# Patient Record
Sex: MALE | Race: WHITE | Employment: FULL TIME | URBAN - METROPOLITAN AREA
[De-identification: names, ages, dates, MRNs, and addresses within clinical notes are randomized per-mention and may not be internally consistent; named-entity substitution may affect disease eponyms.]

---

## 2019-11-04 ENCOUNTER — HOSPITAL ENCOUNTER (OUTPATIENT)
Dept: SPEECH THERAPY | Age: 44
Setting detail: THERAPIES SERIES
Discharge: HOME | End: 2019-11-04
Payer: COMMERCIAL

## 2019-11-04 ENCOUNTER — HOSPITAL ENCOUNTER (OUTPATIENT)
Dept: GENERAL RADIOLOGY | Age: 44
Discharge: HOME | End: 2019-11-04
Payer: COMMERCIAL

## 2019-11-04 DIAGNOSIS — J30.9 ALLERGIC RHINITIS, UNSPECIFIED SEASONALITY, UNSPECIFIED TRIGGER: ICD-10-CM

## 2019-11-04 DIAGNOSIS — R09.89 CHOKING SENSATION: ICD-10-CM

## 2019-11-04 DIAGNOSIS — E87.5 HYPERKALEMIA: ICD-10-CM

## 2019-11-04 DIAGNOSIS — R74.8 ELEVATED LIVER ENZYMES: ICD-10-CM

## 2019-11-04 DIAGNOSIS — R63.5 ABNORMAL WEIGHT GAIN: ICD-10-CM

## 2019-11-04 DIAGNOSIS — E78.5 HYPERLIPIDEMIA, UNSPECIFIED HYPERLIPIDEMIA TYPE: ICD-10-CM

## 2019-11-04 DIAGNOSIS — R09.89 ABNORMAL CHEST SOUNDS: ICD-10-CM

## 2019-11-04 PROCEDURE — 92611 MOTION FLUOROSCOPY/SWALLOW: CPT

## 2019-11-04 PROCEDURE — 74230 X-RAY XM SWLNG FUNCJ C+: CPT

## 2022-12-09 ENCOUNTER — TELEPHONE (OUTPATIENT)
Dept: BARIATRICS/WEIGHT MGMT | Age: 47
End: 2022-12-09

## 2023-01-19 ENCOUNTER — OFFICE VISIT (OUTPATIENT)
Dept: BARIATRICS/WEIGHT MGMT | Age: 48
End: 2023-01-19
Payer: COMMERCIAL

## 2023-01-19 VITALS
DIASTOLIC BLOOD PRESSURE: 80 MMHG | HEART RATE: 68 BPM | BODY MASS INDEX: 36.64 KG/M2 | SYSTOLIC BLOOD PRESSURE: 120 MMHG | WEIGHT: 285.5 LBS | OXYGEN SATURATION: 100 % | HEIGHT: 74 IN

## 2023-01-19 DIAGNOSIS — Z79.899 MEDICATION MANAGEMENT: ICD-10-CM

## 2023-01-19 DIAGNOSIS — K42.9 UMBILICAL HERNIA WITHOUT OBSTRUCTION OR GANGRENE: ICD-10-CM

## 2023-01-19 DIAGNOSIS — E66.01 MORBID OBESITY DUE TO EXCESS CALORIES (HCC): Primary | ICD-10-CM

## 2023-01-19 DIAGNOSIS — Z01.818 PRE-OPERATIVE CLEARANCE: ICD-10-CM

## 2023-01-19 PROCEDURE — 99204 OFFICE O/P NEW MOD 45 MIN: CPT | Performed by: SURGERY

## 2023-01-19 RX ORDER — CITALOPRAM 20 MG/1
TABLET ORAL
COMMUNITY
Start: 2022-11-23

## 2023-01-19 RX ORDER — TOPIRAMATE 25 MG/1
TABLET ORAL
COMMUNITY
Start: 2020-03-06

## 2023-01-19 RX ORDER — ROSUVASTATIN CALCIUM 20 MG/1
TABLET, COATED ORAL
COMMUNITY
Start: 2022-11-23

## 2023-01-19 RX ORDER — PANTOPRAZOLE SODIUM 40 MG/1
TABLET, DELAYED RELEASE ORAL
COMMUNITY
Start: 2022-11-23

## 2023-01-19 RX ORDER — ALBUTEROL SULFATE 90 UG/1
AEROSOL, METERED RESPIRATORY (INHALATION)
COMMUNITY

## 2023-01-19 RX ORDER — MONTELUKAST SODIUM 10 MG/1
TABLET ORAL
COMMUNITY

## 2023-01-19 RX ORDER — LOSARTAN POTASSIUM AND HYDROCHLOROTHIAZIDE 25; 100 MG/1; MG/1
TABLET ORAL
COMMUNITY
Start: 2022-11-23

## 2023-01-19 ASSESSMENT — ENCOUNTER SYMPTOMS
SINUS PRESSURE: 1
GASTROINTESTINAL NEGATIVE: 1
SINUS PAIN: 1
RESPIRATORY NEGATIVE: 1
ALLERGIC/IMMUNOLOGIC NEGATIVE: 1

## 2023-01-19 NOTE — PROGRESS NOTES
Initial Bariatric Surgery Consultation History and Physical    Chief Complaint: Obesity Body mass index is 37.16 kg/m². History of Present Illness: The patient is a 50 y.o. male being seen today for initial bariatric surgery consultation. The patient previously attended a bariatric surgery informational seminar or received electronic version of presentation. The patient's PCP is Dr. Brynn Gautam. The patient first recognized having issues with increased weight approximately 5-7 years ago. The patient identifies the following precipitants causing, or contributing to, weight gain: turning 40    The patient estimates the lowest weight in the past five years is approximately 210 pounds. The patient estimates the highest weight in the past five years is approximately 285 pounds. The patient's current weight is 285 pounds, while the current BMI is Body mass index is 37.16 kg/m². The patient has not had previous bariatric surgery. The patient has tried the following diet(s): denies    The patient has tried the following over-the-counter drugs and/or prescription weight loss medications: Adipex    The patient has tried the following physical activities or exercises: walking    The patient was mostly unsuccessful with previous dietary, medication, and/or physical activity for sustained weight loss. The patient reports current level of commitment to weight loss as: 100 percent    In reviewing the patient's typical daily diet, it consists of the following:   Breakfast: doesn't eat   Snack: diet mountain dew   Lunch: salad   Snack: denies   Dinner: [employer]   Snack: denies    The patient reports drinking the following beverages throughout the day: diet mountain dew, water    The patient reports eating outside of the house approximately 3 times per week at either fast food or sit down restaurants.      The patient reports the following eating and dietary styles: nighttime eating and stress-related eating patterns. The patient has the following cardiovascular risk factor(s):  hypertension, dyslipidemia, elevated triglycerides. The patient has the following obesity-related disorder(s): denies. Past Medical History:  Past Medical History:   Diagnosis Date    Acute sinusitis 2/16/2012    Urgetn Care: zpack, cap mist DM    Back pain     Deviated septum     Headache(784.0)     Stress/ Tension, prn OTC NSAID    Seasonal allergies     OTC claritin prn   Hypertension  Hyperlipidemia    Past Surgical History:  Past Surgical History:   Procedure Laterality Date    NASAL SEPTUM SURGERY      TONSILLECTOMY AND ADENOIDECTOMY         Family History:  Family History   Problem Relation Age of Onset    Diabetes Mother     Obesity Mother     High Cholesterol Father     Cancer Maternal Grandmother     Heart Disease Maternal Grandmother     High Blood Pressure Maternal Grandmother        Social History:  Social History     Socioeconomic History    Marital status:      Spouse name: Osiris Lee    Number of children: 2    Years of education: Not on file    Highest education level: Not on file   Occupational History    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: [employer]   Tobacco Use    Smoking status: Never    Smokeless tobacco: Not on file   Substance and Sexual Activity    Alcohol use:  Yes     Alcohol/week: 2.5 standard drinks     Types: 3 drink(s) per week     Comment: 3 20-oz soda    Drug use: No    Sexual activity: Yes     Partners: Male   Other Topics Concern    Not on file   Social History Narrative    Live with wife and 2 kids    Getting business management degree [employer]                 Social Determinants of Health     Financial Resource Strain: Not on file   Food Insecurity: Not on file   Transportation Needs: Not on file   Physical Activity: Not on file   Stress: Not on file   Social Connections: Not on file   Intimate Partner Violence: Not on file   Housing Stability: Not on file Allergies: Allergies   Allergen Reactions    Ciprofloxacin      HIves    Pcn [Penicillins] Rash       Medications:  Current Outpatient Medications   Medication Sig Dispense Refill    citalopram (CELEXA) 20 MG tablet citalopram 20 mg tablet   TAKE 1 TABLET BY MOUTH EVERY DAY      losartan-hydroCHLOROthiazide (HYZAAR) 100-25 MG per tablet losartan 100 mg-hydrochlorothiazide 25 mg tablet   TAKE 1 TABLET BY MOUTH EVERY DAY      metoprolol tartrate (LOPRESSOR) 25 MG tablet metoprolol tartrate 25 mg tablet   TAKE 1 TABLET BY MOUTH TWICE DAILY      pantoprazole (PROTONIX) 40 MG tablet pantoprazole 40 mg tablet,delayed release   TAKE 1 TABLET BY MOUTH EVERY DAY 30 MINUTES BEFORE BREAKFAST      rosuvastatin (CRESTOR) 20 MG tablet rosuvastatin 20 mg tablet   TAKE 1 TABLET BY MOUTH EVERY DAY      topiramate (TOPAMAX) 25 MG tablet Topamax 25 mg tablet   1 tab in am and 2 tab in evening      albuterol sulfate HFA (PROVENTIL;VENTOLIN;PROAIR) 108 (90 Base) MCG/ACT inhaler albuterol sulfate HFA 90 mcg/actuation aerosol inhaler   INHALE 2 PUFFS BY MOUTH EVERY 6 HOURS AS NEEDED      montelukast (SINGULAIR) 10 MG tablet montelukast 10 mg tablet   TAKE 1 TABLET BY MOUTH EVERY DAY       No current facility-administered medications for this visit. Review of Systems:  Review of Systems   Constitutional:  Positive for unexpected weight change. HENT:  Positive for sinus pressure and sinus pain. Eyes:  Positive for visual disturbance (wears contacts). Respiratory: Negative. Cardiovascular: Negative. Gastrointestinal: Negative. Endocrine: Negative. Genitourinary: Negative. Musculoskeletal: Negative. Skin: Negative. Allergic/Immunologic: Negative. Neurological: Negative. Hematological: Negative. Psychiatric/Behavioral: Negative. Physical Exam:  Physical Exam  Vitals reviewed. Constitutional:       Appearance: He is obese. HENT:      Head: Normocephalic and atraumatic.       Right Ear: External ear normal.      Left Ear: External ear normal.      Nose: Nose normal.   Eyes:      General:         Right eye: No discharge. Left eye: No discharge. Cardiovascular:      Rate and Rhythm: Normal rate. Pulmonary:      Effort: No respiratory distress. Abdominal:      Palpations: Abdomen is soft. Hernia: A hernia (umbilical) is present. Musculoskeletal:         General: No swelling. Skin:     General: Skin is warm. Neurological:      General: No focal deficit present. Mental Status: He is alert. Assessment and Plan:  Khai Figueroa is a 50 y.o. presenting to clinic for initial surgical evaluation for bariatric surgery. Patient Active Problem List   Diagnosis    Allergic rhinitis    Family history of high cholesterol    Family history of diabetes mellitus    Back spasm    Fever       Plan   1. Because of the known health risks associated with excess body weight, the patient is a good candidate for bariatric surgery. Reviewed with the patient both sleeve gastrectomy and RYGB, including an overview of each--with pros and cons--while showing them a picture diagram of the new anatomical surgical changes. The patient is interested in sleeve gastrectomy. Discussed with the patient the basics of the surgical procedure including risks, benefits, alternatives, and expected hospital course. Expectations were discussed with the patient and the patient agreed to proceed. The patient previously attended an informational seminar which discussed surgical and non-surgical options as well as procedure specific risks, benefits, and alternatives. 2. Will order initial bariatric surgery labs unless the patient has had any in the previous six months. 3. The patient will be scheduled to be seen by our weight management CNP and registered dietician after having the above labs drawn and resulted.     4. The patient will be referred for an evaluation by physical therapy for assistance with an exercise plan. 5. The patient will be given a bariatric handbook by the dietician at the initial nutrition class. The patient will be instructed to review the handbook and to ask questions about any part which is not clear at any time throughout the workup process. It was stressed to the patient the need to thoroughly review the handbook and the patient agreed. 6. Discussed with patient that losing weight prior to surgery can be indicator of post-operative success. Goal set at this visit for patient for weight loss prior to surgery is 10-15 lbs. 7. Discussed with patient need for long-term follow-up and vitamin supplementation. The patient is agreeable. 8. The pt also has umbilical hernia. Desires repair. D/w pt the pros and cons of repairing before and after bariatric surgery. Pt desires to wait until after surgery and weight loss. Should pt decide not to have bariatric surgery then will plan umbilical hernia repair. Thank you for this referral / consult. Please call with any questions or concerns you have. Patient was seen with total face-to-face time and time spent reviewing chart, placing orders, and reviewing past/current results of labs and images of over 45 minutes. More than 50% of this visit was counseling on diet, nutrition, surgical options, and education as above documented in my note.     Electronically signed by Ariana David II, MD on 1/19/2023 at 2:24 PM

## 2023-02-09 ENCOUNTER — OFFICE VISIT (OUTPATIENT)
Dept: BARIATRICS/WEIGHT MGMT | Age: 48
End: 2023-02-09

## 2023-02-09 VITALS — HEIGHT: 74 IN | WEIGHT: 285 LBS | BODY MASS INDEX: 36.57 KG/M2

## 2023-02-09 VITALS
WEIGHT: 285 LBS | HEART RATE: 83 BPM | HEIGHT: 74 IN | DIASTOLIC BLOOD PRESSURE: 90 MMHG | SYSTOLIC BLOOD PRESSURE: 140 MMHG | BODY MASS INDEX: 36.57 KG/M2

## 2023-02-09 DIAGNOSIS — E66.9 OBESITY (BMI 30-39.9): Primary | ICD-10-CM

## 2023-02-09 NOTE — PROGRESS NOTES
Nutrition Counseling    REASON FOR VISIT: Initial Nutrition Class    Chief Complaint:    Chief Complaint   Patient presents with    Weight Management       SUBJECTIVE:  Pt here for initial nutrition class. Instructed on mindful eating, label reading, calorie counting, healthy food choices and goal setting. Pt signed goal card and verbalized understanding to all information provided. The patient is a 50 y.o. male being seen for morbid obesity, considering weight loss surgery; Korey's, Height: 6' 1.5\" (186.7 cm), Weight: 285 lb (129.3 kg), Current Body mass index is 37.09 kg/m². The patient's PCP is Jaxon Herrera MD    Comorbid Conditions:diseases affecting this patient are   Past Medical History:   Diagnosis Date    Acute sinusitis 2/16/2012    Urgetn Care: zpack, cap mist DM    Back pain     Deviated septum     Headache(784.0)     Stress/ Tension, prn OTC NSAID    Seasonal allergies     OTC claritin prn   . Review of Systems - Review of Systems  Otherwise per HPI. Allergies: Allergies   Allergen Reactions    Ciprofloxacin      HIves    Pcn [Penicillins] Rash       Past Surgical History:  Past Surgical History:   Procedure Laterality Date    NASAL SEPTUM SURGERY      TONSILLECTOMY AND ADENOIDECTOMY         Family History:  Family History   Problem Relation Age of Onset    Diabetes Mother     Obesity Mother     High Cholesterol Father     Cancer Maternal Grandmother     Heart Disease Maternal Grandmother     High Blood Pressure Maternal Grandmother        Social History:  Social History     Socioeconomic History    Marital status:      Spouse name: Courtney Harris    Number of children: 2    Years of education: Not on file    Highest education level: Not on file   Occupational History    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: [employer]   Tobacco Use    Smoking status: Never    Smokeless tobacco: Not on file   Substance and Sexual Activity    Alcohol use:  Yes     Alcohol/week: 2.5 standard drinks     Types: 3 drink(s) per week     Comment: 3 20-oz soda    Drug use: No    Sexual activity: Yes     Partners: Male   Other Topics Concern    Not on file   Social History Narrative    Live with wife and 2 kids    Getting business management degree [employer]                 Social Determinants of Health     Financial Resource Strain: Not on file   Food Insecurity: Not on file   Transportation Needs: Not on file   Physical Activity: Not on file   Stress: Not on file   Social Connections: Not on file   Intimate Partner Violence: Not on file   Housing Stability: Not on file         OBJECTIVE:  Physical Exam   Ht 6' 1.5\" (1.867 m)   Wt 285 lb (129.3 kg)   BMI 37.09 kg/m²        NUTRITION DIAGNOSIS: Overweight / Obesity   Problem: Increased adiposity compared to reference standard or established norms   Etiology: Excess intake compared to output over time   S/S: Ht: 6' 1.5\" Wt: 285 lb BMI: 37.09    NUTRITION INTERVENTIONS:    Individualized treatment goals to address nutrition diagnosis:   Instructed on 1500 kcal diet for weight loss   Provided sample menus, healthy foods list, and meal plate model   Encouraged record keeping and physical activity as approved by physician    MONITORING/ EVALUATION/ PLAN:   Pt verbalized understanding of all materials covered   Pt asked pertinent questions throughout the session - expect compliance with nutrition guidelines presented   Provided pt with contact information should questions arise prior to next visit   Will f/u with pt in 4-5 months for further education and post-op diet instructions    Trent Brown, MMSc, RD, LD

## 2023-02-09 NOTE — PROGRESS NOTES
Dianelys Hidalgo  1975  50 y.o. SUBJECT EVELYN:  HPI: Dianelys Hidalgo presents today for Initial Surgical Weight Loss Class instructed by this provider and registered dietician. Patient had an initial individual consult with Dr. Renetta Wei for surgical weight loss management and agrees to follow the program guidelines. Chief Complaint   Patient presents with    Weight Management     class    Education Class  Past Medical History:   Diagnosis Date    Acute sinusitis 2/16/2012    Urgetn Care: zpack, cap mist DM    Back pain     Deviated septum     Headache(784.0)     Stress/ Tension, prn OTC NSAID    Seasonal allergies     OTC claritin prn     Past Surgical History:   Procedure Laterality Date    NASAL SEPTUM SURGERY      TONSILLECTOMY AND ADENOIDECTOMY       Current Outpatient Medications   Medication Sig Dispense Refill    albuterol sulfate HFA (PROVENTIL;VENTOLIN;PROAIR) 108 (90 Base) MCG/ACT inhaler albuterol sulfate HFA 90 mcg/actuation aerosol inhaler   INHALE 2 PUFFS BY MOUTH EVERY 6 HOURS AS NEEDED      citalopram (CELEXA) 20 MG tablet citalopram 20 mg tablet   TAKE 1 TABLET BY MOUTH EVERY DAY      losartan-hydroCHLOROthiazide (HYZAAR) 100-25 MG per tablet losartan 100 mg-hydrochlorothiazide 25 mg tablet   TAKE 1 TABLET BY MOUTH EVERY DAY      metoprolol tartrate (LOPRESSOR) 25 MG tablet metoprolol tartrate 25 mg tablet   TAKE 1 TABLET BY MOUTH TWICE DAILY      montelukast (SINGULAIR) 10 MG tablet montelukast 10 mg tablet   TAKE 1 TABLET BY MOUTH EVERY DAY      pantoprazole (PROTONIX) 40 MG tablet pantoprazole 40 mg tablet,delayed release   TAKE 1 TABLET BY MOUTH EVERY DAY 30 MINUTES BEFORE BREAKFAST      rosuvastatin (CRESTOR) 20 MG tablet rosuvastatin 20 mg tablet   TAKE 1 TABLET BY MOUTH EVERY DAY      topiramate (TOPAMAX) 25 MG tablet Topamax 25 mg tablet   1 tab in am and 2 tab in evening       No current facility-administered medications for this visit.      Family History   Problem Relation Age of Onset    Diabetes Mother     Obesity Mother     High Cholesterol Father     Cancer Maternal Grandmother     Heart Disease Maternal Grandmother     High Blood Pressure Maternal Grandmother      Allergies   Allergen Reactions    Ciprofloxacin      HIves    Pcn [Penicillins] Rash        OBJECTIVE:     BP (!) 140/90   Pulse 83   Ht 6' 1.5\" (1.867 m)   Wt 285 lb (129.3 kg)   BMI 37.09 kg/m²   Wt Readings from Last 3 Encounters:   02/09/23 285 lb (129.3 kg)   02/09/23 285 lb (129.3 kg)   01/19/23 285 lb 8 oz (129.5 kg)       - Patient attended the Surgical Weight Loss Group class today. - Instructed on risks of obesity, criteria for surgical intervention, advantages of weight loss surgery and the program requirements. - Also instructed on mindful eating, label reading, calorie counting, healthy food choices, and goal setting.    - Patient was provided a foods list, meal plan, and goal card. - Patient asked pertinent questions throughout session and answers given. - Patient voiced understanding of all information provided. - Patient is to refer to surgical handbook or to call office if questions arise.     - Goal card signed. Patient was seen with total face to face time of 40 minutes in a group setting. More than 50% of this visit was counseling on obesity, strict diet recommendations, nutrition and education as above in my assessment and plan section of my note.